# Patient Record
Sex: FEMALE | Race: OTHER | HISPANIC OR LATINO | Employment: FULL TIME | URBAN - METROPOLITAN AREA
[De-identification: names, ages, dates, MRNs, and addresses within clinical notes are randomized per-mention and may not be internally consistent; named-entity substitution may affect disease eponyms.]

---

## 2021-04-14 ENCOUNTER — PRE-ADMISSION TESTING (OUTPATIENT)
Dept: ADMISSIONS | Facility: MEDICAL CENTER | Age: 46
End: 2021-04-14
Attending: SPECIALIST
Payer: COMMERCIAL

## 2021-04-14 DIAGNOSIS — Z01.812 PRE-OPERATIVE LABORATORY EXAMINATION: ICD-10-CM

## 2021-04-14 LAB
ANION GAP SERPL CALC-SCNC: 15 MMOL/L (ref 7–16)
BUN SERPL-MCNC: 15 MG/DL (ref 8–22)
CALCIUM SERPL-MCNC: 9.8 MG/DL (ref 8.5–10.5)
CHLORIDE SERPL-SCNC: 111 MMOL/L (ref 96–112)
CO2 SERPL-SCNC: 20 MMOL/L (ref 20–33)
CREAT SERPL-MCNC: 0.62 MG/DL (ref 0.5–1.4)
ERYTHROCYTE [DISTWIDTH] IN BLOOD BY AUTOMATED COUNT: 65.6 FL (ref 35.9–50)
GLUCOSE SERPL-MCNC: 108 MG/DL (ref 65–99)
HCT VFR BLD AUTO: 39.5 % (ref 37–47)
HGB BLD-MCNC: 12.1 G/DL (ref 12–16)
MCH RBC QN AUTO: 24.2 PG (ref 27–33)
MCHC RBC AUTO-ENTMCNC: 30.6 G/DL (ref 33.6–35)
MCV RBC AUTO: 79 FL (ref 81.4–97.8)
PLATELET # BLD AUTO: 337 K/UL (ref 164–446)
PMV BLD AUTO: 10 FL (ref 9–12.9)
POTASSIUM SERPL-SCNC: 4.3 MMOL/L (ref 3.6–5.5)
RBC # BLD AUTO: 5 M/UL (ref 4.2–5.4)
SODIUM SERPL-SCNC: 146 MMOL/L (ref 135–145)
WBC # BLD AUTO: 6.3 K/UL (ref 4.8–10.8)

## 2021-04-14 PROCEDURE — 85027 COMPLETE CBC AUTOMATED: CPT

## 2021-04-14 PROCEDURE — 36415 COLL VENOUS BLD VENIPUNCTURE: CPT

## 2021-04-14 PROCEDURE — 80048 BASIC METABOLIC PNL TOTAL CA: CPT

## 2021-04-14 RX ORDER — PNV NO.95/FERROUS FUM/FOLIC AC 28MG-0.8MG
TABLET ORAL DAILY
COMMUNITY
End: 2021-04-14

## 2021-04-28 NOTE — H&P
REASON FOR SURGERY:  Menometrorrhagia, severe dysmenorrhea, dyspareunia,   pelvic pain, symptomatic pelvic floor relaxation with near-complete   uterovaginal prolapse, mixed urinary incontinence with stress urinary   incontinence demonstrated on urodynamic studies.     HISTORY OF PRESENT ILLNESS:  This is a 45-year-old woman  4, para 3,   therapeutic  1 with a negative urine pregnancy test on 2021, who   states that she has exhausted all conservative measures and wishes to proceed   forward with attempted definitive surgical correction of her longstanding   history of abnormal uterine bleeding, near-complete vaginal prolapse since she   has failed pelvic floor exercise therapy, declining pessary therapy, and   wished to proceed forward with laparoscopic-assisted vaginal hysterectomy,   bilateral salpingectomy.  She is interested in proceeding forward with ovarian   conservation, native tissue repair in the form of an anterior and posterior   vaginal repair, enterocele repair, perineoplasty, sacrospinous vault   suspension, mid urethral sling procedure.  Risks, benefits, and alternatives   of all individual portions of the surgery were addressed with the patient in   detail over several visits.  She has asked appropriate questions, signed the   appropriate consents, and wishing to proceed forward with the scheduled   surgery as planned.  Of note, she does state that she understands multiple   limitations of surgery with respect to her pelvic pain, dyspareunia,   overactive bladder symptoms, all of which may be unimproved or actually worsen   with time or with the surgery as described above, requiring additional   medical or surgical management and even despite these multiple limitations of   surgery, she is wishing to proceed forward with the scheduled surgery on   2021.     PAST MEDICAL HISTORY:  Anemia, gallbladder disease, otherwise as stated above.     PAST SURGICAL HISTORY:   Cholecystectomy in , tubal sterilization as she   had completed her family in .     OBSTETRICAL HISTORY:  The patient does give a history of 3 previous vaginal   deliveries, largest infant 9 pounds, one therapeutic .     GYNECOLOGIC HISTORY:  She does report completely irregular cycles at this   time, report excessive bleeding with passage of large clots, severe   dysmenorrhea, dyspareunia, pelvic pain, symptomatic pelvic floor relaxation   with a bulge at the vaginal introitus, urinary incontinence as demonstrated on   urodynamic studies.  She denies any previous sexually transmitted disease or   pelvic infections.     FAMILY HISTORY:  Noncontributory.     REVIEW OF SYSTEMS:  Otherwise unremarkable.     SOCIAL HISTORY:  She works as a nurse assistant.  She denies any alcohol,   tobacco, or other drug use.     MEDICATIONS:  None.     ALLERGIES:  No known drug allergies.     PHYSICAL EXAMINATION:  VITAL SIGNS:  She is afebrile, hemodynamically stable.  Current vital signs   can be seen in electronic medical record.  HEART:  Regular rate and rhythm.  CHEST:  Clear to auscultation bilaterally.  ABDOMEN:  Soft, nondistended, and nontender.  Bowel sounds were present.  PELVIC:  Shows normal external female genitalia.  Grade II anterior,   posterior, and uterine relaxation with tenderness to palpation noted at the   uterine fundus.  No adnexal mass or tenderness to palpation were appreciated.    Rectovaginal exam confirmed the above.  She is guaiac negative.  EXTREMITIES:  Nontender.     LABORATORY DATA:  Urine pregnancy test was negative.  Transvaginal pelvic   ultrasound did show multiple uterine fibroids, superior fibroid measuring 1.9   x 1.8 x 2.2 cm, posterior fibroid measuring 2.3 x 1.4 x 2 cm, posterior fundal   fibroid measuring 3.5 x 3.2 x 3.5 cm.  There is a cystic versus fibroid mass   within the endometrial cavity measuring 6 x 6 x 5 mm.  There is a simple cyst   on the left ovary measuring  3.4 cm and a simple cyst on the right ovary   measuring 1.5 cm.  She has another ultrasound pending.  Urodynamic studies did   demonstrate and confirm stress urinary incontinence.  She also has an   endometrial biopsy currently pending.     ASSESSMENT AND PLAN:  A 45-year-old woman  4, para 3, therapeutic    with negative urine pregnancy test, longstanding history of   menometrorrhagia, dysmenorrhea, dyspareunia, pelvic pain, symptomatic pelvic   floor relaxation, mixed urinary incontinence, and stress urinary incontinence   demonstrated on urodynamic studies, failing conservative management, failing   pelvic floor exercise therapy, declining pessary therapy, now wishing to   proceed forward with the surgery as described above, understanding multiple   limitations of surgery along with her symptomatic uterine fibroids.        ______________________________  MD KEELY BERUMEN/EM    DD:  2021 08:39  DT:  2021 10:20    Job#:  [id number]

## 2021-04-30 ENCOUNTER — PRE-ADMISSION TESTING (OUTPATIENT)
Dept: ADMISSIONS | Facility: MEDICAL CENTER | Age: 46
End: 2021-04-30
Attending: SPECIALIST
Payer: COMMERCIAL

## 2021-04-30 DIAGNOSIS — Z01.812 PRE-OPERATIVE LABORATORY EXAMINATION: ICD-10-CM

## 2021-04-30 LAB
COVID ORDER STATUS COVID19: NORMAL
SARS-COV-2 RNA RESP QL NAA+PROBE: NOTDETECTED
SPECIMEN SOURCE: NORMAL

## 2021-04-30 PROCEDURE — C9803 HOPD COVID-19 SPEC COLLECT: HCPCS

## 2021-04-30 PROCEDURE — U0005 INFEC AGEN DETEC AMPLI PROBE: HCPCS

## 2021-04-30 PROCEDURE — U0003 INFECTIOUS AGENT DETECTION BY NUCLEIC ACID (DNA OR RNA); SEVERE ACUTE RESPIRATORY SYNDROME CORONAVIRUS 2 (SARS-COV-2) (CORONAVIRUS DISEASE [COVID-19]), AMPLIFIED PROBE TECHNIQUE, MAKING USE OF HIGH THROUGHPUT TECHNOLOGIES AS DESCRIBED BY CMS-2020-01-R: HCPCS

## 2021-05-04 ENCOUNTER — ANESTHESIA (OUTPATIENT)
Dept: SURGERY | Facility: MEDICAL CENTER | Age: 46
End: 2021-05-04
Payer: COMMERCIAL

## 2021-05-04 ENCOUNTER — HOSPITAL ENCOUNTER (OUTPATIENT)
Facility: MEDICAL CENTER | Age: 46
End: 2021-05-04
Attending: SPECIALIST | Admitting: SPECIALIST
Payer: COMMERCIAL

## 2021-05-04 ENCOUNTER — ANESTHESIA EVENT (OUTPATIENT)
Dept: SURGERY | Facility: MEDICAL CENTER | Age: 46
End: 2021-05-04
Payer: COMMERCIAL

## 2021-05-04 VITALS
TEMPERATURE: 97.4 F | OXYGEN SATURATION: 94 % | RESPIRATION RATE: 14 BRPM | HEIGHT: 60 IN | HEART RATE: 74 BPM | SYSTOLIC BLOOD PRESSURE: 116 MMHG | DIASTOLIC BLOOD PRESSURE: 72 MMHG | WEIGHT: 150.57 LBS | BODY MASS INDEX: 29.56 KG/M2

## 2021-05-04 LAB
HCG UR QL: NEGATIVE
PATHOLOGY CONSULT NOTE: NORMAL

## 2021-05-04 PROCEDURE — 500854 HCHG NEEDLE, INSUFFLATION FOR STEP: Performed by: SPECIALIST

## 2021-05-04 PROCEDURE — 700111 HCHG RX REV CODE 636 W/ 250 OVERRIDE (IP): Performed by: SPECIALIST

## 2021-05-04 PROCEDURE — C1771 REP DEV, URINARY, W/SLING: HCPCS | Performed by: SPECIALIST

## 2021-05-04 PROCEDURE — 501579 HCHG TROCAR, STEP 5MM: Performed by: SPECIALIST

## 2021-05-04 PROCEDURE — 502703 HCHG DEVICE, LIGASURE V SEALER: Performed by: SPECIALIST

## 2021-05-04 PROCEDURE — 501577 HCHG TROCAR, STEP 11MM: Performed by: SPECIALIST

## 2021-05-04 PROCEDURE — 501330 HCHG SET, CYSTO IRRIG TUBING: Performed by: SPECIALIST

## 2021-05-04 PROCEDURE — 160048 HCHG OR STATISTICAL LEVEL 1-5: Performed by: SPECIALIST

## 2021-05-04 PROCEDURE — 700105 HCHG RX REV CODE 258: Performed by: SPECIALIST

## 2021-05-04 PROCEDURE — 160041 HCHG SURGERY MINUTES - EA ADDL 1 MIN LEVEL 4: Performed by: SPECIALIST

## 2021-05-04 PROCEDURE — 700101 HCHG RX REV CODE 250: Performed by: SPECIALIST

## 2021-05-04 PROCEDURE — 160002 HCHG RECOVERY MINUTES (STAT): Performed by: SPECIALIST

## 2021-05-04 PROCEDURE — 160029 HCHG SURGERY MINUTES - 1ST 30 MINS LEVEL 4: Performed by: SPECIALIST

## 2021-05-04 PROCEDURE — 502240 HCHG MISC OR SUPPLY RC 0272: Performed by: SPECIALIST

## 2021-05-04 PROCEDURE — 160036 HCHG PACU - EA ADDL 30 MINS PHASE I: Performed by: SPECIALIST

## 2021-05-04 PROCEDURE — 700111 HCHG RX REV CODE 636 W/ 250 OVERRIDE (IP)

## 2021-05-04 PROCEDURE — A9270 NON-COVERED ITEM OR SERVICE: HCPCS | Performed by: SPECIALIST

## 2021-05-04 PROCEDURE — 700101 HCHG RX REV CODE 250

## 2021-05-04 PROCEDURE — 160035 HCHG PACU - 1ST 60 MINS PHASE I: Performed by: SPECIALIST

## 2021-05-04 PROCEDURE — 88307 TISSUE EXAM BY PATHOLOGIST: CPT

## 2021-05-04 PROCEDURE — 502000 HCHG MISC OR IMPLANTS RC 0278: Performed by: SPECIALIST

## 2021-05-04 PROCEDURE — 700102 HCHG RX REV CODE 250 W/ 637 OVERRIDE(OP)

## 2021-05-04 PROCEDURE — 501516 HCHG SUTURE, CAPIO: Performed by: SPECIALIST

## 2021-05-04 PROCEDURE — 500886 HCHG PACK, LAPAROSCOPY: Performed by: SPECIALIST

## 2021-05-04 PROCEDURE — 501838 HCHG SUTURE GENERAL: Performed by: SPECIALIST

## 2021-05-04 PROCEDURE — 160009 HCHG ANES TIME/MIN: Performed by: SPECIALIST

## 2021-05-04 PROCEDURE — 160046 HCHG PACU - 1ST 60 MINS PHASE II: Performed by: SPECIALIST

## 2021-05-04 PROCEDURE — 81025 URINE PREGNANCY TEST: CPT

## 2021-05-04 PROCEDURE — A9270 NON-COVERED ITEM OR SERVICE: HCPCS

## 2021-05-04 PROCEDURE — 160025 RECOVERY II MINUTES (STATS): Performed by: SPECIALIST

## 2021-05-04 DEVICE — SYSTEM SLING SOLYX SIS BLUE (1/EA): Type: IMPLANTABLE DEVICE | Site: BLADDER | Status: FUNCTIONAL

## 2021-05-04 RX ORDER — BUPIVACAINE HYDROCHLORIDE AND EPINEPHRINE 2.5; 5 MG/ML; UG/ML
INJECTION, SOLUTION EPIDURAL; INFILTRATION; INTRACAUDAL; PERINEURAL
Status: DISCONTINUED | OUTPATIENT
Start: 2021-05-04 | End: 2021-05-04 | Stop reason: HOSPADM

## 2021-05-04 RX ORDER — ROCURONIUM BROMIDE 10 MG/ML
INJECTION, SOLUTION INTRAVENOUS PRN
Status: DISCONTINUED | OUTPATIENT
Start: 2021-05-04 | End: 2021-05-04 | Stop reason: SURG

## 2021-05-04 RX ORDER — CEFAZOLIN SODIUM 1 G/3ML
INJECTION, POWDER, FOR SOLUTION INTRAMUSCULAR; INTRAVENOUS PRN
Status: DISCONTINUED | OUTPATIENT
Start: 2021-05-04 | End: 2021-05-04 | Stop reason: SURG

## 2021-05-04 RX ORDER — OXYCODONE HCL 5 MG/5 ML
5 SOLUTION, ORAL ORAL
Status: COMPLETED | OUTPATIENT
Start: 2021-05-04 | End: 2021-05-04

## 2021-05-04 RX ORDER — DIPHENHYDRAMINE HYDROCHLORIDE 50 MG/ML
12.5 INJECTION INTRAMUSCULAR; INTRAVENOUS
Status: DISCONTINUED | OUTPATIENT
Start: 2021-05-04 | End: 2021-05-04 | Stop reason: HOSPADM

## 2021-05-04 RX ORDER — SODIUM CHLORIDE, SODIUM LACTATE, POTASSIUM CHLORIDE, CALCIUM CHLORIDE 600; 310; 30; 20 MG/100ML; MG/100ML; MG/100ML; MG/100ML
INJECTION, SOLUTION INTRAVENOUS CONTINUOUS
Status: ACTIVE | OUTPATIENT
Start: 2021-05-04 | End: 2021-05-04

## 2021-05-04 RX ORDER — EPINEPHRINE 1 MG/ML(1)
AMPUL (ML) INJECTION
Status: DISCONTINUED
Start: 2021-05-04 | End: 2021-05-04 | Stop reason: HOSPADM

## 2021-05-04 RX ORDER — KETOROLAC TROMETHAMINE 30 MG/ML
INJECTION, SOLUTION INTRAMUSCULAR; INTRAVENOUS PRN
Status: DISCONTINUED | OUTPATIENT
Start: 2021-05-04 | End: 2021-05-04 | Stop reason: SURG

## 2021-05-04 RX ORDER — GENTAMICIN SULFATE 40 MG/ML
INJECTION, SOLUTION INTRAMUSCULAR; INTRAVENOUS
Status: DISCONTINUED | OUTPATIENT
Start: 2021-05-04 | End: 2021-05-04 | Stop reason: HOSPADM

## 2021-05-04 RX ORDER — OXYCODONE HCL 5 MG/5 ML
10 SOLUTION, ORAL ORAL
Status: COMPLETED | OUTPATIENT
Start: 2021-05-04 | End: 2021-05-04

## 2021-05-04 RX ORDER — PROMETHAZINE HYDROCHLORIDE 25 MG/1
12.5 SUPPOSITORY RECTAL EVERY 4 HOURS PRN
Status: DISCONTINUED | OUTPATIENT
Start: 2021-05-04 | End: 2021-05-04 | Stop reason: HOSPADM

## 2021-05-04 RX ORDER — SIMETHICONE 80 MG
80 TABLET,CHEWABLE ORAL EVERY 8 HOURS PRN
Status: DISCONTINUED | OUTPATIENT
Start: 2021-05-04 | End: 2021-05-04 | Stop reason: HOSPADM

## 2021-05-04 RX ORDER — HALOPERIDOL 5 MG/ML
1 INJECTION INTRAMUSCULAR
Status: DISCONTINUED | OUTPATIENT
Start: 2021-05-04 | End: 2021-05-04 | Stop reason: HOSPADM

## 2021-05-04 RX ORDER — BUPIVACAINE HYDROCHLORIDE 2.5 MG/ML
INJECTION, SOLUTION EPIDURAL; INFILTRATION; INTRACAUDAL
Status: DISCONTINUED
Start: 2021-05-04 | End: 2021-05-04 | Stop reason: HOSPADM

## 2021-05-04 RX ORDER — GENTAMICIN SULFATE 40 MG/ML
INJECTION, SOLUTION INTRAMUSCULAR; INTRAVENOUS
Status: DISCONTINUED
Start: 2021-05-04 | End: 2021-05-04 | Stop reason: HOSPADM

## 2021-05-04 RX ORDER — ONDANSETRON 2 MG/ML
INJECTION INTRAMUSCULAR; INTRAVENOUS PRN
Status: DISCONTINUED | OUTPATIENT
Start: 2021-05-04 | End: 2021-05-04 | Stop reason: SURG

## 2021-05-04 RX ORDER — ONDANSETRON 2 MG/ML
4 INJECTION INTRAMUSCULAR; INTRAVENOUS
Status: DISCONTINUED | OUTPATIENT
Start: 2021-05-04 | End: 2021-05-04 | Stop reason: HOSPADM

## 2021-05-04 RX ORDER — DEXAMETHASONE SODIUM PHOSPHATE 4 MG/ML
INJECTION, SOLUTION INTRA-ARTICULAR; INTRALESIONAL; INTRAMUSCULAR; INTRAVENOUS; SOFT TISSUE PRN
Status: DISCONTINUED | OUTPATIENT
Start: 2021-05-04 | End: 2021-05-04 | Stop reason: SURG

## 2021-05-04 RX ADMIN — OXYCODONE HYDROCHLORIDE 10 MG: 5 SOLUTION ORAL at 13:44

## 2021-05-04 RX ADMIN — SODIUM CHLORIDE, POTASSIUM CHLORIDE, SODIUM LACTATE AND CALCIUM CHLORIDE: 600; 310; 30; 20 INJECTION, SOLUTION INTRAVENOUS at 07:00

## 2021-05-04 RX ADMIN — FENTANYL CITRATE 50 MCG: 50 INJECTION, SOLUTION INTRAMUSCULAR; INTRAVENOUS at 09:45

## 2021-05-04 RX ADMIN — DEXAMETHASONE SODIUM PHOSPHATE 8 MG: 4 INJECTION, SOLUTION INTRA-ARTICULAR; INTRALESIONAL; INTRAMUSCULAR; INTRAVENOUS; SOFT TISSUE at 07:22

## 2021-05-04 RX ADMIN — FENTANYL CITRATE 25 MCG: 50 INJECTION, SOLUTION INTRAMUSCULAR; INTRAVENOUS at 11:00

## 2021-05-04 RX ADMIN — SUGAMMADEX 200 MG: 100 INJECTION, SOLUTION INTRAVENOUS at 08:42

## 2021-05-04 RX ADMIN — FENTANYL CITRATE 25 MCG: 50 INJECTION, SOLUTION INTRAMUSCULAR; INTRAVENOUS at 09:25

## 2021-05-04 RX ADMIN — ONDANSETRON 8 MG: 2 INJECTION INTRAMUSCULAR; INTRAVENOUS at 07:22

## 2021-05-04 RX ADMIN — ROCURONIUM BROMIDE 50 MG: 10 INJECTION, SOLUTION INTRAVENOUS at 07:22

## 2021-05-04 RX ADMIN — KETOROLAC TROMETHAMINE 30 MG: 30 INJECTION, SOLUTION INTRAMUSCULAR at 08:43

## 2021-05-04 RX ADMIN — OXYCODONE HYDROCHLORIDE 10 MG: 5 SOLUTION ORAL at 09:30

## 2021-05-04 RX ADMIN — FENTANYL CITRATE 250 MCG: 50 INJECTION, SOLUTION INTRAMUSCULAR; INTRAVENOUS at 08:25

## 2021-05-04 RX ADMIN — CEFAZOLIN 2 G: 330 INJECTION, POWDER, FOR SOLUTION INTRAMUSCULAR; INTRAVENOUS at 07:21

## 2021-05-04 RX ADMIN — FENTANYL CITRATE 250 MCG: 50 INJECTION, SOLUTION INTRAMUSCULAR; INTRAVENOUS at 07:22

## 2021-05-04 RX ADMIN — PROPOFOL 200 MG: 10 INJECTION, EMULSION INTRAVENOUS at 07:22

## 2021-05-04 RX ADMIN — POVIDONE IODINE 15 ML: 100 SOLUTION TOPICAL at 07:00

## 2021-05-04 RX ADMIN — FENTANYL CITRATE 50 MCG: 50 INJECTION, SOLUTION INTRAMUSCULAR; INTRAVENOUS at 11:52

## 2021-05-04 ASSESSMENT — PAIN DESCRIPTION - PAIN TYPE
TYPE: SURGICAL PAIN

## 2021-05-04 NOTE — OR SURGEON
Immediate Post OP Note    PreOp Diagnosis: Menometrorrhagia, severe dysmenorrhea, dyspareunia,   pelvic pain, symptomatic pelvic floor relaxation with near-complete   uterovaginal prolapse, mixed urinary incontinence with stress urinary   incontinence demonstrated on urodynamic studies.      PostOp Diagnosis: Same; final pathology pending      Procedure(s):  HYSTERECTOMY, TOTAL, VAGINAL, LAPAROSCOPY-ASSISTED - Wound Class: Clean  SALPINGECTOMY - Wound Class: Clean  COLPORRHAPHY, COMBINED ANTEROPOSTERIOR - PERINEOPLASTY - Wound Class: Clean Contaminated  REPAIR, ENTEROCELE - Wound Class: Clean Contaminated  BLADDER SLING, FEMALE - MIDURETHRAL - Wound Class: Clean Contaminated  COLPOPEXY - SACROSPINOUS VAULT SUSPENSION. - Wound Class: Clean Contaminated    Surgeon(s):  ANGEL Sheth M.D.    Anesthesiologist/Type of Anesthesia:  Anesthesiologist: Kris Strickland M.D./General    Surgical Staff:  Circulator: Ayla Hunt R.N.  Scrub Person: Bella Juan    Specimens removed if any:  ID Type Source Tests Collected by Time Destination   A : Uterus, cervix, bilateral fallopain tube, left peritubal cyst  Other Other PATHOLOGY SPECIMEN Jose Manjarrez M.D. 5/4/2021  7:44 AM        Estimated Blood Loss: 100 ccs    Findings: Bulbous uterus with normal appearing adnexa and pelvis, good support of the anterior and the posterior and the apical vaginal tissue without any undue tension at any suture sites, cystoscopy revealed bilateral ureteral efflux, normal bladder and no undue tension at the mid urethra after sling placement.     Complications: None        5/4/2021 8:54 AM Jose Manjarrez M.D.

## 2021-05-04 NOTE — OR NURSING
1303- patient arrived to phase 2 from pacu.  Report received from kelton gutiérrez.  Patient assisted to chair with minimal assistance.  Attached to VS machine.      1315- DC paperwork reviewed.  Explained collado care paperwork and how to remove/care for.  All questions answered.  Syringe and alcohol swaps provided for removal of catheter in morning.  Catheter placed in stat lock.  One time order of pain medication approved from anesthesia per patient request for trip home.   1335- patient changed into clothing with minimal assistance from significant other.     1401- patient assisted to wheelchair and taken curbside for pickup from significant other.  All belongings accounted for.  DC paperwork and collado instructions with patient.

## 2021-05-04 NOTE — ANESTHESIA TIME REPORT
Anesthesia Start and Stop Event Times     Date Time Event    5/4/2021 0700 Ready for Procedure     0721 Anesthesia Start     0905 Anesthesia Stop        Responsible Staff  05/04/21    Name Role Begin End    Kris Strickland M.D. Anesth 0721 0905        Preop Diagnosis (Free Text):  Pre-op Diagnosis     IRREGULAR BLEEDING, DYSMENORRHEA, PELVIC PAIN, STRESS URINARY INCONTIINENCE        Preop Diagnosis (Codes):    Post op Diagnosis  Abnormal uterine bleeding      Premium Reason  Non-Premium    Comments:

## 2021-05-04 NOTE — OR NURSING
1300 - Report to Trisha, Cate getting patient in phase II.  Pt with stable VS, on room air, pain in control, ready to sit up in chair and go home soon. Pt taken via gurney with RN escort to phase II.

## 2021-05-04 NOTE — OP REPORT
DATE OF SERVICE: 05//0/2021      PREOPERATIVE DIAGNOSES:  Menometrorrhagia, severe dysmenorrhea, dyspareunia,   pelvic pain, symptomatic pelvic floor relaxation with near-complete   uterovaginal prolapse, mixed urinary incontinence with stress urinary   incontinence demonstrated on urodynamic studies.     POSTOPERATIVE DIAGNOSES:  Same; final pathology pending     FINAL PATHOLOGY:  Pending.     PROCEDURES PERFORMED:  Procedure(s):  HYSTERECTOMY, TOTAL, VAGINAL, LAPAROSCOPY-ASSISTED - Wound Class: Clean Contaminated  SALPINGECTOMY - Wound Class: Clean Contaminated  COLPORRHAPHY, COMBINED ANTEROPOSTERIOR - W/PERINEOPLASTY - Wound Class: Clean Contaminated  REPAIR, ENTEROCELE - Wound Class: Clean Contaminated  BLADDER SLING, FEMALE - TOT - Wound Class: Clean Contaminated  COLPOPEXY - SACROSPINOUS VAULT SUSPENSION - Wound Class: Clean Contaminated     SURGEON:  Jose Manjarrez MD.     ASSISTANT:  Tyrese Menezes M.D.     ANESTHESIA:  General     ANESTHESIOLOGIST: Kris Strickland MD.     ESTIMATED BLOOD LOSS FOR THE PROCEDURE:  100 mL.     FINDINGS:  Bulbous uterus with normal appearing adnexa and pelvis, good support of the anterior and the posterior and the apical vaginal tissue without any undue tension at any suture sites, cystoscopy revealed bilateral ureteral efflux, normal bladder and no undue tension at the mid urethra after sling placement.      DESCRIPTION OF PROCEDURE:  The patient was taken to the operating room, where  general anesthesia was performed without difficulty.  The patient was then prepped and draped in usual sterile fashion with lower extremities placed in Nathan stirrups.  Attention was first turned to the perineum, where a weighted speculum was placed with the use of Meyer retractor.  Single-toothed tenaculum was placed on the anterior lip of the cervix.  Hulka uterine manipulator was then placed.  Single-toothed tenaculum and retractors were then removed. Attention was then turned to the umbilicus,  where a 1 cm incision was made.  A Veress needle was placed, 3.5 L of carboperitoneum were then obtained.  A 10 mm trocar port was then placed.  Two separate ports were placed approximately one-third of the way from the anterior-superior iliac spine lateral to the rectus muscle under direct laparoscopic visualization.  Attention was then turned to the distal portion of the fallopian tubes, which were grasped just below the fallopian tube above the ovary.  This area was grasped, cauterized, and transected on each side.  The mesosalpinx underneath the fallopian tube was then grasped, cauterized, and transected all the way to the level of the uterus, freeing up    the fallopian tube on each side.  The uteroovarian, broad, and round ligaments were individually isolated, cauterized, and transected.  The vesicouterine peritoneum was grasped, incised, and the bladder flap was created.  Uterine vessels were then clamped, cauterized, and transected  on each side.  The vesicouterine peritoneum was then completed.   Attention was then turned to the perineum, where a weighted speculum was placed with the use of Meyer retractor.  A thyroid tenaculum was placed on the anterior lip, one on the posterior lips of the cervix.  Cervix was then injected with 10 mL of 0.25%  Marcaine with epinephrine.  Incision was made starting anteriorly, proceeding posterior, proceeding back anterior once again.  With the use of Bovie electrocautery, after injecting 10 mL of 0.25% Marcaine with epinephrine, the anterior cul-de-sac was entered sharply.  Right angle Pato retractor was placed upon sharp entry in the anterior cul-de-sac.  Large duck billed speculum was placed upon sharp entry in the posterior cul-de-sac.  Uterosacral cardinal complexes were then grasped with ZED clamps, transected, and suture ligated with 0 Vicryl suture bilaterally.  Attention was then turned to the perineum, where the uterus and both fallopian tubes were then handed  off the field as specimen.  Excellent hemostasis noted at all vascular pedicles.  The anterior and posterior leaf of the peritoneum in addition to the uterosacral cardinal complexes were reapproximated in the midline with a pursestring    suture using 2-0 Vicryl.  The vaginal cuff was then reapproximated with figure-of-eight sutures using 0 Vicryl reapproximating both uterosacral cardinal complex and respective vaginal apices.  The anterior vaginal mucosa was then injected with 10 mL of 0.25% Marcaine with epinephrine. The vaginal mucosa was then dissected off the underlying pubocervical fascia rafita until the levator muscles were then reached.  Horizontal mattress sutures were then used to reapproximate the pubocervical fascia in midline in a double 0 closure, thereby reducing the midline cystocele.  A separate incision just inferior the urethral meatus was made after injecting 5 ccs of 0.25% Marcaine with epinephrine before dissecting the vaginal mucosa off of the pubocervical fascia and placement of the Solyx transducer loaded with the sling and passed through the incision at the mid urethra and placed through the endopelvic fascia and then into the Obturator internal muscle and released the opposite side was then loaded on the transducer and the opposite was placed into the Obturator internus muscle and before releasing the sling the Dalton catheter was removed and a 30 degree cystoscope was placed with bilateral ureteral efflux, normal bladder and no undue tension at the mid urethra the sling was then released from the transducer and the tensioning was finalized under cystoscopic evaluation. Cystoscope removed.  Dalton catheter replaced.  All excess vaginal mucosa and sling material were then excised.  The vaginal mucosa was then reapproximated with 2-0 Vicryl in a running interlocking fashion in one segment.  Posterior vaginal mucosa was then injected with 10 mL of 0.25% Marcaine with epinephrine.  The vaginal  mucosa was then dissected off the underlying rectovaginal fascia rafita until the levator muscles were then reached.  Dissection of the sacrospinous process and ischial spine was then performed on the right, 3 cm medial along the sacrospinous ligament with straight catheterization needle device, 0 Ethibond suture was taken through the    sacrospinous ligament and taken out through the right apical portion of the vaginal cuff on the underlying vaginal mucosa.  The rectovaginal septum was then reapproximated in the posterior aspect of the vaginal cuff and a double pursestring suture, thereby reducing a large enterocele located at the superior aspect of the dissection.  Horizontal mattress suture was then used to reapproximate the rectovaginal fascia in the midline in a double 0 closure, thereby reducing the midline rectocele.  Excess vaginal mucosa was then    trimmed.  The vaginal mucosa was then reapproximated with 2-0 Vicryl in a running interlocking fashion in one segment for a perineoplasty on the perineum.  Attention was then turned back to the abdomen and pelvis.  Pelvis was inspected and noted to be hemostatic, 3.5 L of carboperitoneum removed followed by removal of the ports under direct laparoscopic visualization.  The incisions were then reapproximated with single interrupted sutures using 4-0 Vicryl, injected with 20 mL of 0.25% Marcaine with epinephrine.    The patient tolerated the procedure well and was awoken from general anesthesia, was taken to the recovery room in stable condition.      ____________________________________  CAPRI DELEON MD

## 2021-05-04 NOTE — ANESTHESIA PROCEDURE NOTES
Airway    Date/Time: 5/4/2021 7:23 AM  Performed by: Kris Strickland M.D.  Authorized by: Kris Strickland M.D.     Location:  OR  Urgency:  Elective  Indications for Airway Management:  Anesthesia      Spontaneous Ventilation: absent    Sedation Level:  Deep  Preoxygenated: Yes    Patient Position:  Sniffing  Final Airway Type:  Endotracheal airway  Final Endotracheal Airway:  ETT  Cuffed: Yes    Technique Used for Successful ETT Placement:  Direct laryngoscopy    Insertion Site:  Oral  Blade Type:  Woo  Laryngoscope Blade/Videolaryngoscope Blade Size:  3  ETT Size (mm):  7.5  Measured from:  Teeth  ETT to Teeth (cm):  22  Placement Verified by: auscultation and capnometry    Cormack-Lehane Classification:  Grade I - full view of glottis  Number of Attempts at Approach:  1

## 2021-05-04 NOTE — DISCHARGE INSTRUCTIONS
ACTIVITY: Rest and take it easy for the first 24 hours.  A responsible adult is recommended to remain with you during that time.  It is normal to feel sleepy.  We encourage you to not do anything that requires balance, judgment or coordination.    MILD FLU-LIKE SYMPTOMS ARE NORMAL. YOU MAY EXPERIENCE GENERALIZED MUSCLE ACHES, THROAT IRRITATION, HEADACHE AND/OR SOME NAUSEA.    FOR 24 HOURS DO NOT:  Drive, operate machinery or run household appliances.  Drink beer or alcoholic beverages.   Make important decisions or sign legal documents.    SPECIAL INSTRUCTIONS: See Handout.  Go to Dr. Manjarrez's office tomorrow after 9am for catheter removal    DIET: To avoid nausea, slowly advance diet as tolerated, avoiding spicy or greasy foods for the first day.  Add more substantial food to your diet according to your physician's instructions.  Babies can be fed formula or breast milk as soon as they are hungry.  INCREASE FLUIDS AND FIBER TO AVOID CONSTIPATION.    SURGICAL DRESSING/BATHING: Ok to shower tomorrow after catheter is removed.  DO NOT submerge in water until ok with doctor (hot tub, pool, bath, etc.)    FOLLOW-UP APPOINTMENT:  A follow-up appointment should be arranged with Dr. Manjarrez [phone]; call to schedule.    You should CALL YOUR PHYSICIAN if you develop:  Fever greater than 101 degrees F.  Pain not relieved by medication, or persistent nausea or vomiting.  Excessive bleeding (blood soaking through dressing) or unexpected drainage from the wound.  Extreme redness or swelling around the incision site, drainage of pus or foul smelling drainage.  Inability to urinate or empty your bladder within 8 hours.  Problems with breathing or chest pain.    You should call 911 if you develop problems with breathing or chest pain.  If you are unable to contact your doctor or surgical center, you should go to the nearest emergency room or urgent care center.    Physician's telephone #:Dr. Manjarrez [phone]    If any  questions arise, call your doctor.  If your doctor is not available, please feel free to call the Surgical Center at [phone]. The Contact Center is open Monday through Friday 7AM to 5PM and may speak to a nurse at [phone], or toll free at [phone].     A registered nurse may call you a few days after your surgery to see how you are doing after your procedure.    MEDICATIONS: Resume taking daily medication.  Take prescribed pain medication with food.  If no medication is prescribed, you may take non-aspirin pain medication if needed.  PAIN MEDICATION CAN BE VERY CONSTIPATING.  Take a stool softener or laxative such as senokot, pericolace, or milk of magnesia if needed.    Prescription given: At Home. Last pain medication given: Toradol (like ibuprofen) given at 8:45am.  Can take ibuprofen after 2:45pm.   Oxycodone given at 9:30am. Can take home medications after 1:30pm    If your physician has prescribed pain medication that includes Acetaminophen (Tylenol), do not take additional Acetaminophen (Tylenol) while taking the prescribed medication.    Depression / Suicide Risk    As you are discharged from this [facility] facility, it is important to learn how to keep safe from harming yourself.    Recognize the warning signs:  · Abrupt changes in personality, positive or negative- including increase in energy   · Giving away possessions  · Change in eating patterns- significant weight changes-  positive or negative  · Change in sleeping patterns- unable to sleep or sleeping all the time   · Unwillingness or inability to communicate  · Depression  · Unusual sadness, discouragement and loneliness  · Talk of wanting to die  · Neglect of personal appearance   · Rebelliousness- reckless behavior  · Withdrawal from people/activities they love  · Confusion- inability to concentrate     If you or a loved one observes any of these behaviors or has concerns about self-harm, here's what you can do:  · Talk  about it- your feelings and reasons for harming yourself  · Remove any means that you might use to hurt yourself (examples: pills, rope, extension cords, firearm)  · Get professional help from the community (Mental Health, Substance Abuse, psychological counseling)  · Do not be alone:Call your Safe Contact- someone whom you trust who will be there for you.  · Call your local CRISIS HOTLINE 440-0831 or 410-378-0500  · Call your local Children's Mobile Crisis Response Team [place] (635) 208-1986 or wwwEzLike  · Call the toll free National Suicide Prevention Hotlines   · National Suicide Prevention Lifeline 565-384-XNZB (4622)  · National Hope Line Network 800-SUICIDE (676-8309)

## 2021-05-04 NOTE — ANESTHESIA POSTPROCEDURE EVALUATION
Patient: Radha Neil    Procedure Summary     Date: 05/04/21 Room / Location: [facility] ROOM 23 / SURGERY SAME DAY [facility]    Anesthesia Start: 0721 Anesthesia Stop: 0905    Procedures:       HYSTERECTOMY, TOTAL, VAGINAL, LAPAROSCOPY-ASSISTED (Abdomen)      SALPINGECTOMY (Bilateral Abdomen)      COLPORRHAPHY, COMBINED ANTEROPOSTERIOR - PERINEOPLASTY (Vagina )      REPAIR, ENTEROCELE (Vagina )      BLADDER SLING, FEMALE - MIDURETHRAL (Bladder)      COLPOPEXY - SACROSPINOUS VAULT SUSPENSION. (Vagina ) Diagnosis: (IRREGULAR BLEEDING, DYSMENORRHEA, PELVIC PAIN, STRESS URINARY INCONTIINENCE)    Surgeons: Jose Manjarrez M.D. Responsible Provider: Kris Strickland M.D.    Anesthesia Type: general ASA Status: 2          Final Anesthesia Type: general  Last vitals  BP   Blood Pressure: 123/64    Temp   36.4 °C (97.6 °F)    Pulse   (!) 115   Resp   16    SpO2   100 %      Anesthesia Post Evaluation    Patient location during evaluation: PACU  Patient participation: complete - patient participated  Level of consciousness: awake and alert    Airway patency: patent  Anesthetic complications: no  Cardiovascular status: hemodynamically stable  Respiratory status: acceptable  Hydration status: euvolemic    PONV: none          No complications documented.     Nurse Pain Score: 0 (NPRS)

## 2021-05-04 NOTE — OR NURSING
0900  Pt received to pacu, awake with spontaneous respirations noted. Report received from Dr. Strickland. Vital signs taken and stable.  No distress noted. Abdomen soft, band aids noted to umbilicus, and right and left lower abdomen stab sites, no drainage noted. Dalton catheter in place and connected to direct drainage bag with yellow urine noted in bag. Pt denies pain.     0925 Pt medicated for complaints of pain.    1000  Pt sleepy, states she feels better. Family called with update.    1030 Pt sleeping, vitals stable. Unable to wean off oxygen, pt continues to be very sleepy.    1100  Pt medicated again for complaints of pain level 5, see mar.    1115 Pt sleeping, vitals stable.    1150 Pt waking up, crying and complains of pain, medicated, see mar.    1205 Pt sleeping, vitals stable.    1255 Pt waking up, states she feels better. Pt ready for Phase 2, no nurse available yet. Handoff report to Shana VANN RN for lunch break.

## (undated) DEVICE — SUTURE GENERAL

## (undated) DEVICE — SET LEADWIRE 5 LEAD BEDSIDE DISPOSABLE ECG (1SET OF 5/EA)

## (undated) DEVICE — CANISTER SUCTION RIGID RED 1500CC (40EA/CA)

## (undated) DEVICE — PACK MINOR BASIN - (2EA/CA)

## (undated) DEVICE — KIT  I.V. START (100EA/CA)

## (undated) DEVICE — DEVICE SUTURE CAPTURING

## (undated) DEVICE — TUBE CONNECTING SUCTION - CLEAR PLASTIC STERILE 72 IN (50EA/CA)

## (undated) DEVICE — KIT ANESTHESIA W/CIRCUIT & 3/LT BAG W/FILTER (20EA/CA)

## (undated) DEVICE — ELECTRODE 850 FOAM ADHESIVE - HYDROGEL RADIOTRNSPRNT (50/PK)

## (undated) DEVICE — TRAY SRGPRP PVP IOD WT PRP - (20/CA)

## (undated) DEVICE — GOWN WARMING STANDARD FLEX - (30/CA)

## (undated) DEVICE — LUBRICANT INSTRUMENT 4 ML ELECTRO LUBE (20EA/BX)

## (undated) DEVICE — SUTURE CAPIO (12EA/BX)

## (undated) DEVICE — PACK LAPAROSCOPY - (1/CA)

## (undated) DEVICE — MASK ANESTHESIA ADULT  - (100/CA)

## (undated) DEVICE — TROCAR STEP 5MM - (3/CA)

## (undated) DEVICE — NEPTUNE 4 PORT MANIFOLD - (20/PK)

## (undated) DEVICE — LACTATED RINGERS INJ 1000 ML - (14EA/CA 60CA/PF)

## (undated) DEVICE — BLADE SURGICAL #15 - (50/BX 3BX/CA)

## (undated) DEVICE — LIGASURE LAPAROSCOPIC 5MM - (6EA/CA)

## (undated) DEVICE — BANDAID SHEER STRIP 3/4 IN (100EA/BX 12BX/CA)

## (undated) DEVICE — ARMREST CRADLE FOAM - (2PR/PK 12PR/CA)

## (undated) DEVICE — SUCTION INSTRUMENT YANKAUER BULBOUS TIP W/O VENT (50EA/CA)

## (undated) DEVICE — SUTURE 4-0 VICRYL PLUS FS-2 - 27 INCH (36/BX)

## (undated) DEVICE — TOWEL STOP TIMEOUT SAFETY FLAG (40EA/CA)

## (undated) DEVICE — GLOVE BIOGEL SZ 8 SURGICAL PF LTX - (50PR/BX 4BX/CA)

## (undated) DEVICE — CANISTER SUCTION 3000ML MECHANICAL FILTER AUTO SHUTOFF MEDI-VAC NONSTERILE LF DISP  (40EA/CA)

## (undated) DEVICE — PROTECTOR ULNA NERVE - (36PR/CA)

## (undated) DEVICE — TRAY FOLEY CATHETER STATLOCK 16FR SURESTEP  (10EA/CA)

## (undated) DEVICE — PAD SANITARY 11IN MAXI IND WRAPPED  (12EA/PK 24PK/CA)

## (undated) DEVICE — BANDAID X-LARGE 2 X 4 IN LF (50EA/BX)

## (undated) DEVICE — DRAPE VAGINAL BIB W/ POUCH (10EA/CA)

## (undated) DEVICE — SUTURE 0 VICRYL PLUS CT-1 - 8 X 18 INCH (12/BX)

## (undated) DEVICE — SET IRRIGATION CYSTOSCOPY TUBE L80 IN (20EA/CA)

## (undated) DEVICE — BLADE SURGICAL CLIPPER - (50EA/CA)

## (undated) DEVICE — SLEEVE VASO CALF MED - (10PR/CA)

## (undated) DEVICE — ELECTRODE DUAL RETURN W/ CORD - (50/PK)

## (undated) DEVICE — HEAD HOLDER JUNIOR/ADULT

## (undated) DEVICE — WATER IRRIGATION STERILE 1000ML (12EA/CA)

## (undated) DEVICE — CATHETER IV 20 GA X 1-1/4 ---SURG.& SDS ONLY--- (50EA/BX)

## (undated) DEVICE — GLOVESZ 8.5 BIOGEL PI MICRO - PF LF (50PR/BX)

## (undated) DEVICE — SODIUM CHL IRRIGATION 0.9% 1000ML (12EA/CA)

## (undated) DEVICE — NEEDLE INSUFFLATION FOR STEP - (12/BX)

## (undated) DEVICE — TROCAR STEP 11MM - (3/CA)

## (undated) DEVICE — SET EXTENSION WITH 2 PORTS (48EA/CA) ***PART #2C8610 IS A SUBSTITUTE*****

## (undated) DEVICE — CHLORAPREP 26 ML APPLICATOR - ORANGE TINT(25/CA)

## (undated) DEVICE — TUBING CLEARLINK DUO-VENT - C-FLO (48EA/CA)

## (undated) DEVICE — SENSOR SPO2 NEO LNCS ADHESIVE (20/BX) SEE USER NOTES

## (undated) DEVICE — SUTURE 2-0 VICRYL PLUS CT-1 36 (36PK/BX)"

## (undated) DEVICE — SET SUCTION/IRRIGATION WITH DISPOSABLE TIP (6/CA )PART #0250-070-520 IS A SUB